# Patient Record
Sex: FEMALE | Race: WHITE | NOT HISPANIC OR LATINO | Employment: FULL TIME | URBAN - METROPOLITAN AREA
[De-identification: names, ages, dates, MRNs, and addresses within clinical notes are randomized per-mention and may not be internally consistent; named-entity substitution may affect disease eponyms.]

---

## 2021-03-24 ENCOUNTER — APPOINTMENT (OUTPATIENT)
Dept: RADIOLOGY | Facility: MEDICAL CENTER | Age: 36
End: 2021-03-24
Attending: EMERGENCY MEDICINE
Payer: MEDICAID

## 2021-03-24 ENCOUNTER — HOSPITAL ENCOUNTER (EMERGENCY)
Facility: MEDICAL CENTER | Age: 36
End: 2021-03-24
Attending: EMERGENCY MEDICINE
Payer: MEDICAID

## 2021-03-24 VITALS
WEIGHT: 174.38 LBS | TEMPERATURE: 98.2 F | DIASTOLIC BLOOD PRESSURE: 68 MMHG | HEIGHT: 69 IN | SYSTOLIC BLOOD PRESSURE: 109 MMHG | HEART RATE: 71 BPM | BODY MASS INDEX: 25.83 KG/M2 | RESPIRATION RATE: 15 BRPM | OXYGEN SATURATION: 99 %

## 2021-03-24 DIAGNOSIS — R10.31 RIGHT LOWER QUADRANT ABDOMINAL PAIN: ICD-10-CM

## 2021-03-24 DIAGNOSIS — N83.201 CYST OF RIGHT OVARY: ICD-10-CM

## 2021-03-24 LAB
ALBUMIN SERPL BCP-MCNC: 4.5 G/DL (ref 3.2–4.9)
ALBUMIN/GLOB SERPL: 1.5 G/DL
ALP SERPL-CCNC: 56 U/L (ref 30–99)
ALT SERPL-CCNC: 17 U/L (ref 2–50)
ANION GAP SERPL CALC-SCNC: 10 MMOL/L (ref 7–16)
APPEARANCE UR: CLEAR
AST SERPL-CCNC: 13 U/L (ref 12–45)
BACTERIA #/AREA URNS HPF: NEGATIVE /HPF
BASOPHILS # BLD AUTO: 0.5 % (ref 0–1.8)
BASOPHILS # BLD: 0.06 K/UL (ref 0–0.12)
BILIRUB SERPL-MCNC: 0.4 MG/DL (ref 0.1–1.5)
BILIRUB UR QL STRIP.AUTO: NEGATIVE
BUN SERPL-MCNC: 8 MG/DL (ref 8–22)
CALCIUM SERPL-MCNC: 9.5 MG/DL (ref 8.5–10.5)
CHLORIDE SERPL-SCNC: 107 MMOL/L (ref 96–112)
CO2 SERPL-SCNC: 23 MMOL/L (ref 20–33)
COLOR UR: YELLOW
CREAT SERPL-MCNC: 0.63 MG/DL (ref 0.5–1.4)
EOSINOPHIL # BLD AUTO: 0.04 K/UL (ref 0–0.51)
EOSINOPHIL NFR BLD: 0.3 % (ref 0–6.9)
EPI CELLS #/AREA URNS HPF: ABNORMAL /HPF
ERYTHROCYTE [DISTWIDTH] IN BLOOD BY AUTOMATED COUNT: 38.5 FL (ref 35.9–50)
GLOBULIN SER CALC-MCNC: 3.1 G/DL (ref 1.9–3.5)
GLUCOSE SERPL-MCNC: 95 MG/DL (ref 65–99)
GLUCOSE UR STRIP.AUTO-MCNC: NEGATIVE MG/DL
HCG UR QL: NEGATIVE
HCT VFR BLD AUTO: 39.8 % (ref 37–47)
HGB BLD-MCNC: 13 G/DL (ref 12–16)
HYALINE CASTS #/AREA URNS LPF: ABNORMAL /LPF
IMM GRANULOCYTES # BLD AUTO: 0.05 K/UL (ref 0–0.11)
IMM GRANULOCYTES NFR BLD AUTO: 0.4 % (ref 0–0.9)
KETONES UR STRIP.AUTO-MCNC: NEGATIVE MG/DL
LEUKOCYTE ESTERASE UR QL STRIP.AUTO: ABNORMAL
LIPASE SERPL-CCNC: 20 U/L (ref 11–82)
LYMPHOCYTES # BLD AUTO: 2.55 K/UL (ref 1–4.8)
LYMPHOCYTES NFR BLD: 21.8 % (ref 22–41)
MCH RBC QN AUTO: 27.8 PG (ref 27–33)
MCHC RBC AUTO-ENTMCNC: 32.7 G/DL (ref 33.6–35)
MCV RBC AUTO: 85 FL (ref 81.4–97.8)
MICRO URNS: ABNORMAL
MONOCYTES # BLD AUTO: 0.41 K/UL (ref 0–0.85)
MONOCYTES NFR BLD AUTO: 3.5 % (ref 0–13.4)
NEUTROPHILS # BLD AUTO: 8.57 K/UL (ref 2–7.15)
NEUTROPHILS NFR BLD: 73.5 % (ref 44–72)
NITRITE UR QL STRIP.AUTO: NEGATIVE
NRBC # BLD AUTO: 0 K/UL
NRBC BLD-RTO: 0 /100 WBC
PH UR STRIP.AUTO: 6.5 [PH] (ref 5–8)
PLATELET # BLD AUTO: 222 K/UL (ref 164–446)
PMV BLD AUTO: 11.8 FL (ref 9–12.9)
POTASSIUM SERPL-SCNC: 4 MMOL/L (ref 3.6–5.5)
PROT SERPL-MCNC: 7.6 G/DL (ref 6–8.2)
PROT UR QL STRIP: NEGATIVE MG/DL
RBC # BLD AUTO: 4.68 M/UL (ref 4.2–5.4)
RBC # URNS HPF: ABNORMAL /HPF
RBC UR QL AUTO: ABNORMAL
SODIUM SERPL-SCNC: 140 MMOL/L (ref 135–145)
SP GR UR STRIP.AUTO: 1.01
UROBILINOGEN UR STRIP.AUTO-MCNC: 0.2 MG/DL
WBC # BLD AUTO: 11.7 K/UL (ref 4.8–10.8)
WBC #/AREA URNS HPF: ABNORMAL /HPF

## 2021-03-24 PROCEDURE — 83690 ASSAY OF LIPASE: CPT

## 2021-03-24 PROCEDURE — 81025 URINE PREGNANCY TEST: CPT

## 2021-03-24 PROCEDURE — 85025 COMPLETE CBC W/AUTO DIFF WBC: CPT

## 2021-03-24 PROCEDURE — 76856 US EXAM PELVIC COMPLETE: CPT

## 2021-03-24 PROCEDURE — 81001 URINALYSIS AUTO W/SCOPE: CPT

## 2021-03-24 PROCEDURE — 74177 CT ABD & PELVIS W/CONTRAST: CPT

## 2021-03-24 PROCEDURE — 80053 COMPREHEN METABOLIC PANEL: CPT

## 2021-03-24 PROCEDURE — 700117 HCHG RX CONTRAST REV CODE 255: Performed by: EMERGENCY MEDICINE

## 2021-03-24 PROCEDURE — 99284 EMERGENCY DEPT VISIT MOD MDM: CPT

## 2021-03-24 RX ORDER — HYDROCODONE BITARTRATE AND ACETAMINOPHEN 5; 325 MG/1; MG/1
2 TABLET ORAL EVERY 4 HOURS PRN
Qty: 12 TABLET | Refills: 0 | Status: SHIPPED | OUTPATIENT
Start: 2021-03-24 | End: 2021-03-27

## 2021-03-24 RX ADMIN — IOHEXOL 90 ML: 350 INJECTION, SOLUTION INTRAVENOUS at 23:05

## 2021-03-25 NOTE — ED NOTES
"Pt discharged home via ambulatory to lobby with steady gait, AOx4, family accompanying. IV discontinued and gauze placed, pt in possession of belongings. Pt provided discharge education and information pertaining to medications and follow up appointments. Pt received copy of discharge instructions and verbalized understanding.     /68   Pulse 71   Temp 36.8 °C (98.2 °F) (Temporal)   Resp 15   Ht 1.753 m (5' 9\")   Wt 79.1 kg (174 lb 6.1 oz)   SpO2 99%   BMI 25.75 kg/m²   "

## 2021-03-25 NOTE — ED TRIAGE NOTES
"Chief Complaint   Patient presents with   • Sent by MD   • Abdominal Pain     36 yo female ambulatory to triage for above complaint. Pt reports 3/10 dull RLQ pain, states she had an US done today and \"they're unsure if there is blood flow to my right ovary\". Also reports burning with urination and red tinged urine.    Educated on triage process, encourage to inform staff of any changes.     /87   Pulse 74   Temp 36.7 °C (98.1 °F) (Temporal)   Resp 20   Ht 1.753 m (5' 9\")   Wt 79.1 kg (174 lb 6.1 oz)   SpO2 98%   BMI 25.75 kg/m²   "

## 2021-03-25 NOTE — ED PROVIDER NOTES
"ED Provider Note    CHIEF COMPLAINT  Chief Complaint   Patient presents with   • Sent by MD   • Abdominal Pain       HPI  Nissa Oliveros is a 35 y.o. female who presents for evaluation of right lower quadrant and right flank pain which is present off and on for several weeks.  Patient states it occasionally \"doubles me over in pain.\"  She has noticed intermittent symptoms consisting of dysuria and urinary frequency which have been treated several times with antibiotics as an outpatient presumptively.  These have resulted in transient improvement in the results however the ultrasound performed today at [facility] demonstrated a large cyst and they were unable  to demonstrate blood flow.  There is concern for a torsed ovary.  Patient notes right now her pain is controlled but is worse with palpation.  She describes no fevers or chills and has not had nausea, vomiting, or diarrhea.  She has occasionally had red-tinged urine.    REVIEW OF SYSTEMS  Constitutional: No fevers or chills  Skin: No rashes  HEENT: No sore throat, runny nose, sores, trouble swallowing, trouble speaking.  Neck: No neck pain, stiffness, or masses.  Chest: No pain   Pulm: No shortness of breath, cough, wheezing, stridor, or pain with inspiration/expiration  Gastrointestinal: No nausea, vomiting, diarrhea, constipation, bloating, melena, or hematochezia   Genitourinary: Intermittent dysuria with scant hematuria.  No vaginal bleeding or significant discharge.  Musculoskeletal: No recent trauma, pain, swelling, weakness  Neurologic: No sensory or motor changes. No confusion or disorientation.  Heme: No bleeding or bruising problems.   Immuno: No hx of recurrent infections      PAST MEDICAL HISTORY   No significant past medical history    SOCIAL HISTORY  Social History     Tobacco Use   • Smoking status: Never Smoker   • Smokeless tobacco: Never Used   Substance and Sexual Activity   • Alcohol use: Never   • Drug use: Never   • Sexual " "activity: Not on file       SURGICAL HISTORY  patient denies any surgical history    CURRENT MEDICATIONS  Home Medications     Reviewed by Latisha Cordero (Pharmacy Tech) on 03/24/21 at 2132  Med List Status: Complete   Medication Last Dose Status        Patient Cedric Taking any Medications                       ALLERGIES  No Known Allergies    PHYSICAL EXAM  VITAL SIGNS: /68   Pulse 71   Temp 36.8 °C (98.2 °F) (Temporal)   Resp 15   Ht 1.753 m (5' 9\")   Wt 79.1 kg (174 lb 6.1 oz)   SpO2 99%   BMI 25.75 kg/m²    Gen: Alert in no apparent distress.  HEENT: No signs of trauma, Bilateral external ears normal, Nose normal. Conjunctiva normal, Non-icteric.   Neck:  No tenderness, Supple, No masses  Lymphatic: No cervical lymphadenopathy noted.   Cardiovascular: Regular rate and rhythm, no murmurs.  Capillary refill less than 3 seconds to all extremities, 2+ distal pulses.  Thorax & Lungs: Normal breath sounds, No respiratory distress, No wheezing bilateral chest rise  Abdomen: Bowel sounds normal, Soft, mild/moderate right lower quadrant tenderness as well as left lower quadrant tenderness, some mild suprapubic tenderness.  No right upper quadrant or epigastric tenderness. No masses, No pulsatile masses. No Guarding or rebound  Skin: Warm, Dry, No erythema, No rash noted to exposed areas.   Extremities: Intact distal pulses, No edema  Neurologic: Alert , no facial droop, grossly normal coordination and strength  Psychiatric: Affect pleasant    LABS  Results for orders placed or performed during the hospital encounter of 03/24/21   CBC WITH DIFFERENTIAL   Result Value Ref Range    WBC 11.7 (H) 4.8 - 10.8 K/uL    RBC 4.68 4.20 - 5.40 M/uL    Hemoglobin 13.0 12.0 - 16.0 g/dL    Hematocrit 39.8 37.0 - 47.0 %    MCV 85.0 81.4 - 97.8 fL    MCH 27.8 27.0 - 33.0 pg    MCHC 32.7 (L) 33.6 - 35.0 g/dL    RDW 38.5 35.9 - 50.0 fL    Platelet Count 222 164 - 446 K/uL    MPV 11.8 9.0 - 12.9 fL    Neutrophils-Polys " 73.50 (H) 44.00 - 72.00 %    Lymphocytes 21.80 (L) 22.00 - 41.00 %    Monocytes 3.50 0.00 - 13.40 %    Eosinophils 0.30 0.00 - 6.90 %    Basophils 0.50 0.00 - 1.80 %    Immature Granulocytes 0.40 0.00 - 0.90 %    Nucleated RBC 0.00 /100 WBC    Neutrophils (Absolute) 8.57 (H) 2.00 - 7.15 K/uL    Lymphs (Absolute) 2.55 1.00 - 4.80 K/uL    Monos (Absolute) 0.41 0.00 - 0.85 K/uL    Eos (Absolute) 0.04 0.00 - 0.51 K/uL    Baso (Absolute) 0.06 0.00 - 0.12 K/uL    Immature Granulocytes (abs) 0.05 0.00 - 0.11 K/uL    NRBC (Absolute) 0.00 K/uL   COMP METABOLIC PANEL   Result Value Ref Range    Sodium 140 135 - 145 mmol/L    Potassium 4.0 3.6 - 5.5 mmol/L    Chloride 107 96 - 112 mmol/L    Co2 23 20 - 33 mmol/L    Anion Gap 10.0 7.0 - 16.0    Glucose 95 65 - 99 mg/dL    Bun 8 8 - 22 mg/dL    Creatinine 0.63 0.50 - 1.40 mg/dL    Calcium 9.5 8.5 - 10.5 mg/dL    AST(SGOT) 13 12 - 45 U/L    ALT(SGPT) 17 2 - 50 U/L    Alkaline Phosphatase 56 30 - 99 U/L    Total Bilirubin 0.4 0.1 - 1.5 mg/dL    Albumin 4.5 3.2 - 4.9 g/dL    Total Protein 7.6 6.0 - 8.2 g/dL    Globulin 3.1 1.9 - 3.5 g/dL    A-G Ratio 1.5 g/dL   LIPASE   Result Value Ref Range    Lipase 20 11 - 82 U/L   URINALYSIS    Specimen: Urine, Clean Catch   Result Value Ref Range    Color Yellow     Character Clear     Specific Gravity 1.008 <1.035    Ph 6.5 5.0 - 8.0    Glucose Negative Negative mg/dL    Ketones Negative Negative mg/dL    Protein Negative Negative mg/dL    Bilirubin Negative Negative    Urobilinogen, Urine 0.2 Negative    Nitrite Negative Negative    Leukocyte Esterase Small (A) Negative    Occult Blood Trace (A) Negative    Micro Urine Req Microscopic    URINE MICROSCOPIC (W/UA)   Result Value Ref Range    WBC 2-5 /hpf    RBC 2-5 (A) /hpf    Bacteria Negative None /hpf    Epithelial Cells Few /hpf    Hyaline Cast 0-2 /lpf   ESTIMATED GFR   Result Value Ref Range    GFR If African American >60 >60 mL/min/1.73 m 2    GFR If Non  >60 >60  mL/min/1.73 m 2   HCG QUALITATIVE UR (Lab)   Result Value Ref Range    Beta-Hcg Urine Negative Negative       RADIOLOGY  CT-ABDOMEN-PELVIS WITH   Final Result      1.  No acute abnormality in the abdomen or pelvis.   2.  Large, 7.4 cm right ovarian cyst.      US-PELVIC COMPLETE (TRANSABDOMINAL/TRANSVAGINAL) (COMBO)   Final Result      1.  Normal uterus. Well-positioned IUD.   2.  A 7.7 cm simple right ovarian cyst.   3.  Normal left ovary.   4.  Trace pelvic free fluid.              COURSE & MEDICAL DECISION MAKING  This is a very pleasant 35-year-old female here for evaluation of possible torsed ovary with compromised blood supply.  The patient's history seems inconsistent with this issue, however, as it seems more chronic.  Patient sounds like she has been appropriately treated with antibiotics at several points in the recent past for similar symptoms which seem to resolve at least to some degree after the antibiotics.  We will need to reultrasound to prove blood flow to the ovary as she certainly is a risk with her ovarian cyst.  She does not appear to be in extremis and is quite pleasant and comfortable appearing.  I discussed CT imaging if ultrasound failed to demonstrate the issue as I am concerned she may have a smoldering appendicitis or some other form of pathology requiring intervention.  She stated understanding of this.    10:25 PM  Informed of results of the ultrasound and the fact that she appears to have good blood flow to both ovaries.  It is possible that her cyst is the source of her symptoms however I cannot prove this and I feel a half to disprove alternate pathology by ordering a CT scan.  She stated understanding of the possible future complications from the radiation dosing and agrees to proceed with the study as she too needs the reassurance.    CT imaging was reassuring although does redemonstrate the large ovarian cyst.  There is a small amount of free fluid in the pelvis which may or may  not be related however it does not appear to be associated with any significant pathology requiring intervention or emergent consultation.  Patient appears calm and appears comfortable.  She states understanding the findings and the plan for discharge and follow-up with her primary care physician if symptoms persist.  She will return if they worsen or change in any way.    FINAL IMPRESSION  1. Right lower quadrant abdominal pain    2. Cyst of right ovary        Electronically signed by: Frankie Duran M.D., 3/24/2021 8:18 PM

## 2021-03-25 NOTE — ED NOTES
Med Rec complete per Pt w/family at bedside.  Allergies reviewed  No oral ABX in the last 14 days.

## 2021-09-02 ENCOUNTER — GYNECOLOGY VISIT (OUTPATIENT)
Dept: OBGYN | Facility: CLINIC | Age: 36
End: 2021-09-02
Payer: MEDICAID

## 2021-09-02 VITALS — BODY MASS INDEX: 25.4 KG/M2 | WEIGHT: 172 LBS | DIASTOLIC BLOOD PRESSURE: 78 MMHG | SYSTOLIC BLOOD PRESSURE: 118 MMHG

## 2021-09-02 DIAGNOSIS — N83.201 CYST OF RIGHT OVARY: ICD-10-CM

## 2021-09-02 DIAGNOSIS — Z30.011 ENCOUNTER FOR INITIAL PRESCRIPTION OF CONTRACEPTIVE PILLS: ICD-10-CM

## 2021-09-02 PROCEDURE — 99203 OFFICE O/P NEW LOW 30 MIN: CPT | Performed by: OBSTETRICS & GYNECOLOGY

## 2021-09-02 RX ORDER — LEVONORGESTREL AND ETHINYL ESTRADIOL 0.1-0.02MG
1 KIT ORAL DAILY
Qty: 84 TABLET | Refills: 4 | Status: SHIPPED | OUTPATIENT
Start: 2021-09-02

## 2021-09-02 ASSESSMENT — FIBROSIS 4 INDEX: FIB4 SCORE: 0.47

## 2021-09-02 NOTE — NON-PROVIDER
Pt here for new pt appt  Pt states  Pharmacy verified  Good #: [phone] (home)   LMP:  PAP:  BC:  STD Testing:

## 2021-09-02 NOTE — PROGRESS NOTES
Subjective     Nissa Oliveros is a 35 y.o. female who presents with New Patient (ovarian cyst)        CC: ovarian cyst    HPI: 35-year-old  2 para 1-0-1-1, last menstrual period 2021, not using contraception, presents for follow-up of large ovarian cyst.  The patient was seen in the emergency room in 2021 and noted to have a 7.7 cm right simple ovarian cyst.  She was having severe pain at this time, which has resolved.  She had some pain yesterday, described as mild, but attributes it to her menses.  She denies nausea, vomiting, fevers, chills, abnormal discharge.  She has no history of migraine with aura, liver dysfunction, hypertension, tobacco use, or DVT.  She was previously on OCPs, and ran out, desires refill.  Last Pap smear was in , and normal per patient report.  She has no history of sexually transmitted infections.    History reviewed. No pertinent past medical history.    History reviewed. No pertinent surgical history.      Current Outpatient Medications:   •  levonorgestrel-ethinyl estradiol (AVIANE) 0.1-20 MG-MCG per tablet, Take 1 Tablet by mouth every day., Disp: 84 Tablet, Rfl: 4    Patient has no known allergies.    Family History   Problem Relation Age of Onset   • Diabetes Mother    • Hypertension Mother    • Cancer Father        Social History     Socioeconomic History   • Marital status: Single     Spouse name: Not on file   • Number of children: Not on file   • Years of education: Not on file   • Highest education level: Not on file   Occupational History   • Not on file   Tobacco Use   • Smoking status: Never Smoker   • Smokeless tobacco: Never Used   Substance and Sexual Activity   • Alcohol use: Never   • Drug use: Never   • Sexual activity: Not on file   Other Topics Concern   • Not on file   Social History Narrative   • Not on file     Social Determinants of Health     Financial Resource Strain:    • Difficulty of Paying Living Expenses:    Food Insecurity:    •  Worried About Running Out of Food in the Last Year:    • Ran Out of Food in the Last Year:    Transportation Needs:    • Lack of Transportation (Medical):    • Lack of Transportation (Non-Medical):    Physical Activity:    • Days of Exercise per Week:    • Minutes of Exercise per Session:    Stress:    • Feeling of Stress :    Social Connections:    • Frequency of Communication with Friends and Family:    • Frequency of Social Gatherings with Friends and Family:    • Attends [employer] Services:    • Active Member of Clubs or Organizations:    • Attends Club or Organization Meetings:    • Marital Status:    Intimate Partner Violence:    • Fear of Current or Ex-Partner:    • Emotionally Abused:    • Physically Abused:    • Sexually Abused:        OB History    Para Term  AB Living   2 1 1 0 1 1   SAB TAB Ectopic Molar Multiple Live Births   0 1 0 0 0 1       ROS REVIEW OF SYSTEMS:    Pertinent positives and negatives mentioned in HPI.    All other systems reviewed and are negative or noncontributory.           Objective     /78   Wt 78 kg (172 lb)   LMP 2021   BMI 25.40 kg/m²      Physical Exam        GENERAL: Alert, in no apparent distress  PSYCHIATRIC: Appropriate affect, intact insight and judgement.  NECK:  Nontender, no masses.  No Thyromegaly or nodules. No lymphadenopathy.  RESPIRATORY: Normal respiratory effort.  Lungs clear to auscultation.   CARDIOVASCULAR: RRR, no murmur, gallop, or rub.  ABDOMEN: Soft, nontender, nondistended.  No palpable masses.  No rebound or guarding.  No inguinal lymphadenopathy.  No hepatosplenomegaly.  No hernias.  BACK: No CVA tenderness  EXTREMITIES: No edema  SKIN: No rash    BREAST: No masses or tenderness.  No skin changes.  No nipple inversion or discharge. No axillary lymphadenopathy.      GENITOURINARY:  Normal external genitalia, no lesions.  Normal urethral meatus, no masses or tenderness.  Normal bladder without fullness or masses.  Vagina  well estrogenized, no vaginal discharge or lesions.  Large amount of blood in vault.  Cervix without lesions or discharge, nontender.  Uterus normal size, shape, and contour, nontender.  Adnexa nontender, no masses.  Normal anus and perineum.    Rectal Exam - not indicated.     Radiology 3/24/2021  Pelvic ultrasound -uterus measures 4.1 x 10.8 x 5.5 cm with normal myometrium.  Endometrial echo is less than 5 mm.  The right ovary measures 7.1 x 5.0 x 6.7 cm with a simple right ovarian cyst measuring 7.7 x 6.0 x 4.7 cm.  The left ovary measures 6.5 x 3.6 x 3.8 cm with a corpus luteal cyst present.  There is no fluid in the cul-de-sac.    CT of abdomen and pelvis from 3/24/2021 also reviewed -unremarkable except for right ovarian cyst.         Assessment & Plan        1. Cyst of right ovary  Examination is normal today, no cysts are palpable..  We will obtain repeat ultrasound to check for persistence of cyst.  If the cyst is still persistent and large, we discussed the need for possible surgical intervention by laparoscopic cystectomy.  If the cyst is resolved, no further management is necessary.  She has been on OCPs, and this can help to prevent future cyst.  - US-PELVIC COMPLETE (TRANSABDOMINAL/TRANSVAGINAL) (COMBO); Future    2. Encounter for initial prescription of contraceptive pills  Alesse 1 po daily #84, RF4.    We will call with ultrasound results, and further management.

## 2021-09-28 ENCOUNTER — HOSPITAL ENCOUNTER (OUTPATIENT)
Dept: RADIOLOGY | Facility: MEDICAL CENTER | Age: 36
End: 2021-09-28
Attending: OBSTETRICS & GYNECOLOGY
Payer: COMMERCIAL

## 2021-09-28 DIAGNOSIS — N83.201 CYST OF RIGHT OVARY: ICD-10-CM

## 2021-09-28 PROCEDURE — 76830 TRANSVAGINAL US NON-OB: CPT

## 2021-10-01 ENCOUNTER — TELEPHONE (OUTPATIENT)
Dept: OBGYN | Facility: CLINIC | Age: 36
End: 2021-10-01

## 2021-10-01 NOTE — TELEPHONE ENCOUNTER
----- Message from Earnestine Barcenas M.D. sent at 9/30/2021  5:01 PM PDT -----  Please call patient and inform her there are 2 cysts present, a larger right ovarian cyst measuring 6.5 cm and a smaller left ovarian cyst measuring 4.7 cm.  The cysts look like they could possibly be endometriomas.  Please schedule her an appointment for further discussion of management.      Called pt and notified as above. Pt agreed and verbalized understanding. Pt transferred to [facility] to scheduled f/u appt.

## 2021-11-09 ENCOUNTER — APPOINTMENT (OUTPATIENT)
Dept: OBGYN | Facility: CLINIC | Age: 36
End: 2021-11-09
Payer: COMMERCIAL

## 2022-04-04 ENCOUNTER — PRE-ADMISSION TESTING (OUTPATIENT)
Dept: ADMISSIONS | Facility: MEDICAL CENTER | Age: 37
End: 2022-04-04
Attending: OBSTETRICS & GYNECOLOGY
Payer: COMMERCIAL

## 2022-04-07 NOTE — H&P
DATE OF ADMISSION:  2022     HISTORY OF PRESENT ILLNESS:  The patient is a 36-year-old  1, para 1   with 1 prior , was referred for ovarian cyst noted on ultrasound; and   repeated a CT scan, followup had shown persistent ovarian cyst with probably   hemorrhagic or endometrioma.  The patient was seen in the office, evaluated   and I discussed the various options, opted for laparoscopic ovarian   cystectomy, possible evaluation for endometriosis, treatment for   endometriosis, lysis of adhesions, tubal insufflation and all other indicated   procedure.  The patient is here for preop appointment.  The patient has   history of anemia, which has been corrected with iron supplements.    Depression, is currently not on any medications.     OBSTETRIC HISTORY:   2, para 1, 1 full term delivery,  in   2015.  The patient has been using IUD after the delivery and removed 1 year   ago and has been trying to get pregnant.     SOCIAL HISTORY:  Denies smoking or alcohol abuse.     PAST SURGICAL HISTORY:  History of one  and had breast biopsy.     ALLERGIES:  No known drug allergies.     FAMILY HISTORY:  Mother has asthma, diabetes and hepatitis. Sister has history   of alcoholism and drug abuse.  Maternal grandmother has heart disease.     REVIEW OF SYSTEMS:  The patient is alert and oriented.  Denies shortness of   breath, chest pain or palpitation.  Regular bowel and bladder habits.     SYSTEMIC EXAMINATION:  VITAL SIGNS:  Stable.  See EMR for current vitals.  The patient is 5 feet 9   inch tall and weighs 156 pounds.  CONSTITUTIONAL:  The patient is awake, alert, well developed, well nourished.  RESPIRATORY:  The patient is relaxed, breathes without effort.  Chest expands   symmetrically.  Lungs are clear to auscultate bilaterally.  No crackles,   wheezes or rhonchi.  CARDIOVASCULAR:  Rate is normal, rhythm is regular, S1, S2 normal.  No   murmurs, gallops or  rubs.  GASTROINTESTINAL:  Abdomen is soft, nontender. No guarding or rigidity.  Bowel   sounds are normal.  PELVIC:  Deferred.     IMPRESSION AND PLAN:  A 36-year-old  1, para 1-0-0-1 with 1 prior    with pelvic pain with ultrasound findings of a 7.5-cm ovarian cyst,   confirmed on CT scan, which was suggestive of hemorrhagic cyst.  The patient   has been trying to get pregnant, got out of her IUD the past year, stopped her   birth control pills a few months ago.  The patient denies any prior history   of endometriosis in self or family.  After extensive counseling, the patient   opted for surgery and scheduled for laparoscopic right ovarian   cystectomy/unilateral cystectomy, treatment of endometriosis, lysis of   adhesions and excision and ablation, use of gold laser, tubal insufflation,   possibility of unilateral salpingo-oophorectomy was discussed.  The patient   understands and wants to go ahead with the procedure.  Risks inclusive of but   not limited to infection, injury, bleeding was discussed.  Postoperative   recovery explained.  The patient wants to go ahead with the procedure.        ______________________________  MD REBA Infante/SAUMYA/SHEILA    DD:  2022 13:47  DT:  2022 14:10    Job#:  [id number]

## 2022-04-13 ENCOUNTER — PRE-ADMISSION TESTING (OUTPATIENT)
Dept: ADMISSIONS | Facility: MEDICAL CENTER | Age: 37
End: 2022-04-13
Attending: OBSTETRICS & GYNECOLOGY
Payer: COMMERCIAL

## 2022-04-13 DIAGNOSIS — Z01.812 PRE-OPERATIVE LABORATORY EXAMINATION: ICD-10-CM

## 2022-04-13 LAB
ANION GAP SERPL CALC-SCNC: 11 MMOL/L (ref 7–16)
APPEARANCE UR: CLEAR
BASOPHILS # BLD AUTO: 0.7 % (ref 0–1.8)
BASOPHILS # BLD: 0.06 K/UL (ref 0–0.12)
BILIRUB UR QL STRIP.AUTO: NEGATIVE
BUN SERPL-MCNC: 11 MG/DL (ref 8–22)
CALCIUM SERPL-MCNC: 8.7 MG/DL (ref 8.5–10.5)
CHLORIDE SERPL-SCNC: 105 MMOL/L (ref 96–112)
CO2 SERPL-SCNC: 21 MMOL/L (ref 20–33)
COLOR UR: YELLOW
CREAT SERPL-MCNC: 0.71 MG/DL (ref 0.5–1.4)
EOSINOPHIL # BLD AUTO: 0.18 K/UL (ref 0–0.51)
EOSINOPHIL NFR BLD: 2.1 % (ref 0–6.9)
ERYTHROCYTE [DISTWIDTH] IN BLOOD BY AUTOMATED COUNT: 39.8 FL (ref 35.9–50)
GFR SERPLBLD CREATININE-BSD FMLA CKD-EPI: 114 ML/MIN/1.73 M 2
GLUCOSE SERPL-MCNC: 92 MG/DL (ref 65–99)
GLUCOSE UR STRIP.AUTO-MCNC: NEGATIVE MG/DL
HCG SERPL QL: NEGATIVE
HCT VFR BLD AUTO: 37.1 % (ref 37–47)
HGB BLD-MCNC: 12.3 G/DL (ref 12–16)
IMM GRANULOCYTES # BLD AUTO: 0.03 K/UL (ref 0–0.11)
IMM GRANULOCYTES NFR BLD AUTO: 0.3 % (ref 0–0.9)
KETONES UR STRIP.AUTO-MCNC: NEGATIVE MG/DL
LEUKOCYTE ESTERASE UR QL STRIP.AUTO: NEGATIVE
LYMPHOCYTES # BLD AUTO: 2.5 K/UL (ref 1–4.8)
LYMPHOCYTES NFR BLD: 28.6 % (ref 22–41)
MCH RBC QN AUTO: 28.1 PG (ref 27–33)
MCHC RBC AUTO-ENTMCNC: 33.2 G/DL (ref 33.6–35)
MCV RBC AUTO: 84.9 FL (ref 81.4–97.8)
MICRO URNS: NORMAL
MONOCYTES # BLD AUTO: 0.35 K/UL (ref 0–0.85)
MONOCYTES NFR BLD AUTO: 4 % (ref 0–13.4)
NEUTROPHILS # BLD AUTO: 5.63 K/UL (ref 2–7.15)
NEUTROPHILS NFR BLD: 64.3 % (ref 44–72)
NITRITE UR QL STRIP.AUTO: NEGATIVE
NRBC # BLD AUTO: 0 K/UL
NRBC BLD-RTO: 0 /100 WBC
PH UR STRIP.AUTO: 6.5 [PH] (ref 5–8)
PLATELET # BLD AUTO: 181 K/UL (ref 164–446)
PMV BLD AUTO: 11.5 FL (ref 9–12.9)
POTASSIUM SERPL-SCNC: 4.1 MMOL/L (ref 3.6–5.5)
PROT UR QL STRIP: NEGATIVE MG/DL
RBC # BLD AUTO: 4.37 M/UL (ref 4.2–5.4)
RBC UR QL AUTO: NEGATIVE
SODIUM SERPL-SCNC: 137 MMOL/L (ref 135–145)
SP GR UR STRIP.AUTO: 1.03
UROBILINOGEN UR STRIP.AUTO-MCNC: 0.2 MG/DL
WBC # BLD AUTO: 8.8 K/UL (ref 4.8–10.8)

## 2022-04-13 PROCEDURE — 81003 URINALYSIS AUTO W/O SCOPE: CPT

## 2022-04-13 PROCEDURE — 80048 BASIC METABOLIC PNL TOTAL CA: CPT

## 2022-04-13 PROCEDURE — 85025 COMPLETE CBC W/AUTO DIFF WBC: CPT

## 2022-04-13 PROCEDURE — 84703 CHORIONIC GONADOTROPIN ASSAY: CPT

## 2022-04-13 PROCEDURE — 36415 COLL VENOUS BLD VENIPUNCTURE: CPT

## 2022-04-14 ENCOUNTER — APPOINTMENT (OUTPATIENT)
Dept: ADMISSIONS | Facility: MEDICAL CENTER | Age: 37
End: 2022-04-14
Attending: OBSTETRICS & GYNECOLOGY
Payer: COMMERCIAL

## 2022-04-20 ENCOUNTER — ANESTHESIA (OUTPATIENT)
Dept: SURGERY | Facility: MEDICAL CENTER | Age: 37
End: 2022-04-20
Payer: COMMERCIAL

## 2022-04-20 ENCOUNTER — ANESTHESIA EVENT (OUTPATIENT)
Dept: SURGERY | Facility: MEDICAL CENTER | Age: 37
End: 2022-04-20
Payer: COMMERCIAL

## 2022-04-20 ENCOUNTER — PHARMACY VISIT (OUTPATIENT)
Dept: PHARMACY | Facility: MEDICAL CENTER | Age: 37
End: 2022-04-20
Payer: COMMERCIAL

## 2022-04-20 ENCOUNTER — HOSPITAL ENCOUNTER (OUTPATIENT)
Facility: MEDICAL CENTER | Age: 37
End: 2022-04-20
Attending: OBSTETRICS & GYNECOLOGY | Admitting: OBSTETRICS & GYNECOLOGY
Payer: COMMERCIAL

## 2022-04-20 VITALS
OXYGEN SATURATION: 94 % | BODY MASS INDEX: 23.41 KG/M2 | RESPIRATION RATE: 18 BRPM | HEART RATE: 92 BPM | SYSTOLIC BLOOD PRESSURE: 111 MMHG | HEIGHT: 69 IN | DIASTOLIC BLOOD PRESSURE: 69 MMHG | WEIGHT: 158.07 LBS | TEMPERATURE: 97.6 F

## 2022-04-20 DIAGNOSIS — G89.18 POST-OP PAIN: ICD-10-CM

## 2022-04-20 LAB
HCG UR QL: NEGATIVE
PATHOLOGY CONSULT NOTE: NORMAL

## 2022-04-20 PROCEDURE — 81025 URINE PREGNANCY TEST: CPT

## 2022-04-20 PROCEDURE — 700105 HCHG RX REV CODE 258: Performed by: OBSTETRICS & GYNECOLOGY

## 2022-04-20 PROCEDURE — 500868 HCHG NEEDLE, SURGI(VARES): Performed by: OBSTETRICS & GYNECOLOGY

## 2022-04-20 PROCEDURE — 160046 HCHG PACU - 1ST 60 MINS PHASE II: Performed by: OBSTETRICS & GYNECOLOGY

## 2022-04-20 PROCEDURE — 700111 HCHG RX REV CODE 636 W/ 250 OVERRIDE (IP): Performed by: ANESTHESIOLOGY

## 2022-04-20 PROCEDURE — 88305 TISSUE EXAM BY PATHOLOGIST: CPT | Mod: 59

## 2022-04-20 PROCEDURE — 160048 HCHG OR STATISTICAL LEVEL 1-5: Performed by: OBSTETRICS & GYNECOLOGY

## 2022-04-20 PROCEDURE — 500002 HCHG ADHESIVE, DERMABOND: Performed by: OBSTETRICS & GYNECOLOGY

## 2022-04-20 PROCEDURE — 160028 HCHG SURGERY MINUTES - 1ST 30 MINS LEVEL 3: Performed by: OBSTETRICS & GYNECOLOGY

## 2022-04-20 PROCEDURE — 160035 HCHG PACU - 1ST 60 MINS PHASE I: Performed by: OBSTETRICS & GYNECOLOGY

## 2022-04-20 PROCEDURE — 160039 HCHG SURGERY MINUTES - EA ADDL 1 MIN LEVEL 3: Performed by: OBSTETRICS & GYNECOLOGY

## 2022-04-20 PROCEDURE — 501582 HCHG TROCAR, THRD BLADED: Performed by: OBSTETRICS & GYNECOLOGY

## 2022-04-20 PROCEDURE — 700101 HCHG RX REV CODE 250: Performed by: OBSTETRICS & GYNECOLOGY

## 2022-04-20 PROCEDURE — A9270 NON-COVERED ITEM OR SERVICE: HCPCS | Performed by: ANESTHESIOLOGY

## 2022-04-20 PROCEDURE — 700102 HCHG RX REV CODE 250 W/ 637 OVERRIDE(OP): Performed by: ANESTHESIOLOGY

## 2022-04-20 PROCEDURE — 502000 HCHG MISC OR IMPLANTS RC 0278: Performed by: OBSTETRICS & GYNECOLOGY

## 2022-04-20 PROCEDURE — 160025 RECOVERY II MINUTES (STATS): Performed by: OBSTETRICS & GYNECOLOGY

## 2022-04-20 PROCEDURE — 160047 HCHG PACU  - EA ADDL 30 MINS PHASE II: Performed by: OBSTETRICS & GYNECOLOGY

## 2022-04-20 PROCEDURE — 160009 HCHG ANES TIME/MIN: Performed by: OBSTETRICS & GYNECOLOGY

## 2022-04-20 PROCEDURE — 88342 IMHCHEM/IMCYTCHM 1ST ANTB: CPT

## 2022-04-20 PROCEDURE — RXMED WILLOW AMBULATORY MEDICATION CHARGE: Performed by: OBSTETRICS & GYNECOLOGY

## 2022-04-20 PROCEDURE — 00840 ANES IPER PX LOWER ABD NOS: CPT | Performed by: ANESTHESIOLOGY

## 2022-04-20 PROCEDURE — 700101 HCHG RX REV CODE 250: Performed by: ANESTHESIOLOGY

## 2022-04-20 PROCEDURE — C1765 ADHESION BARRIER: HCPCS | Performed by: OBSTETRICS & GYNECOLOGY

## 2022-04-20 PROCEDURE — 160002 HCHG RECOVERY MINUTES (STAT): Performed by: OBSTETRICS & GYNECOLOGY

## 2022-04-20 PROCEDURE — 700111 HCHG RX REV CODE 636 W/ 250 OVERRIDE (IP): Performed by: OBSTETRICS & GYNECOLOGY

## 2022-04-20 PROCEDURE — 160036 HCHG PACU - EA ADDL 30 MINS PHASE I: Performed by: OBSTETRICS & GYNECOLOGY

## 2022-04-20 PROCEDURE — 500886 HCHG PACK, LAPAROSCOPY: Performed by: OBSTETRICS & GYNECOLOGY

## 2022-04-20 PROCEDURE — 501838 HCHG SUTURE GENERAL: Performed by: OBSTETRICS & GYNECOLOGY

## 2022-04-20 RX ORDER — IPRATROPIUM BROMIDE AND ALBUTEROL SULFATE 2.5; .5 MG/3ML; MG/3ML
3 SOLUTION RESPIRATORY (INHALATION)
Status: DISCONTINUED | OUTPATIENT
Start: 2022-04-20 | End: 2022-04-20 | Stop reason: HOSPADM

## 2022-04-20 RX ORDER — LIDOCAINE HYDROCHLORIDE 20 MG/ML
INJECTION, SOLUTION EPIDURAL; INFILTRATION; INTRACAUDAL; PERINEURAL PRN
Status: DISCONTINUED | OUTPATIENT
Start: 2022-04-20 | End: 2022-04-20 | Stop reason: SURG

## 2022-04-20 RX ORDER — MORPHINE SULFATE 4 MG/ML
1 INJECTION INTRAVENOUS
Status: DISCONTINUED | OUTPATIENT
Start: 2022-04-20 | End: 2022-04-20 | Stop reason: HOSPADM

## 2022-04-20 RX ORDER — MORPHINE SULFATE 10 MG/ML
5 INJECTION, SOLUTION INTRAMUSCULAR; INTRAVENOUS
Status: DISCONTINUED | OUTPATIENT
Start: 2022-04-20 | End: 2022-04-20 | Stop reason: HOSPADM

## 2022-04-20 RX ORDER — SODIUM CHLORIDE, SODIUM LACTATE, POTASSIUM CHLORIDE, CALCIUM CHLORIDE 600; 310; 30; 20 MG/100ML; MG/100ML; MG/100ML; MG/100ML
INJECTION, SOLUTION INTRAVENOUS CONTINUOUS
Status: ACTIVE | OUTPATIENT
Start: 2022-04-20 | End: 2022-04-20

## 2022-04-20 RX ORDER — MORPHINE SULFATE 4 MG/ML
2 INJECTION INTRAVENOUS
Status: DISCONTINUED | OUTPATIENT
Start: 2022-04-20 | End: 2022-04-20 | Stop reason: HOSPADM

## 2022-04-20 RX ORDER — DEXAMETHASONE SODIUM PHOSPHATE 4 MG/ML
INJECTION, SOLUTION INTRA-ARTICULAR; INTRALESIONAL; INTRAMUSCULAR; INTRAVENOUS; SOFT TISSUE PRN
Status: DISCONTINUED | OUTPATIENT
Start: 2022-04-20 | End: 2022-04-20 | Stop reason: SURG

## 2022-04-20 RX ORDER — CEFAZOLIN SODIUM 1 G/3ML
INJECTION, POWDER, FOR SOLUTION INTRAMUSCULAR; INTRAVENOUS PRN
Status: DISCONTINUED | OUTPATIENT
Start: 2022-04-20 | End: 2022-04-20 | Stop reason: SURG

## 2022-04-20 RX ORDER — MORPHINE SULFATE 0.5 MG/ML
INJECTION, SOLUTION EPIDURAL; INTRATHECAL; INTRAVENOUS PRN
Status: DISCONTINUED | OUTPATIENT
Start: 2022-04-20 | End: 2022-04-20 | Stop reason: SURG

## 2022-04-20 RX ORDER — LABETALOL HYDROCHLORIDE 5 MG/ML
5 INJECTION, SOLUTION INTRAVENOUS
Status: DISCONTINUED | OUTPATIENT
Start: 2022-04-20 | End: 2022-04-20 | Stop reason: HOSPADM

## 2022-04-20 RX ORDER — MEPERIDINE HYDROCHLORIDE 25 MG/ML
12.5 INJECTION INTRAMUSCULAR; INTRAVENOUS; SUBCUTANEOUS
Status: DISCONTINUED | OUTPATIENT
Start: 2022-04-20 | End: 2022-04-20 | Stop reason: HOSPADM

## 2022-04-20 RX ORDER — MIDAZOLAM HYDROCHLORIDE 1 MG/ML
1 INJECTION INTRAMUSCULAR; INTRAVENOUS
Status: DISCONTINUED | OUTPATIENT
Start: 2022-04-20 | End: 2022-04-20 | Stop reason: HOSPADM

## 2022-04-20 RX ORDER — HALOPERIDOL 5 MG/ML
1 INJECTION INTRAMUSCULAR
Status: DISCONTINUED | OUTPATIENT
Start: 2022-04-20 | End: 2022-04-20 | Stop reason: HOSPADM

## 2022-04-20 RX ORDER — OXYCODONE HYDROCHLORIDE AND ACETAMINOPHEN 5; 325 MG/1; MG/1
2 TABLET ORAL
Status: COMPLETED | OUTPATIENT
Start: 2022-04-20 | End: 2022-04-20

## 2022-04-20 RX ORDER — MIDAZOLAM HYDROCHLORIDE 1 MG/ML
INJECTION INTRAMUSCULAR; INTRAVENOUS PRN
Status: DISCONTINUED | OUTPATIENT
Start: 2022-04-20 | End: 2022-04-20 | Stop reason: SURG

## 2022-04-20 RX ORDER — ONDANSETRON 2 MG/ML
4 INJECTION INTRAMUSCULAR; INTRAVENOUS
Status: DISCONTINUED | OUTPATIENT
Start: 2022-04-20 | End: 2022-04-20 | Stop reason: HOSPADM

## 2022-04-20 RX ORDER — PHENYLEPHRINE HCL IN 0.9% NACL 0.5 MG/5ML
SYRINGE (ML) INTRAVENOUS PRN
Status: DISCONTINUED | OUTPATIENT
Start: 2022-04-20 | End: 2022-04-20 | Stop reason: SURG

## 2022-04-20 RX ORDER — OXYCODONE HYDROCHLORIDE AND ACETAMINOPHEN 5; 325 MG/1; MG/1
1 TABLET ORAL
Status: COMPLETED | OUTPATIENT
Start: 2022-04-20 | End: 2022-04-20

## 2022-04-20 RX ORDER — KETOROLAC TROMETHAMINE 30 MG/ML
INJECTION, SOLUTION INTRAMUSCULAR; INTRAVENOUS PRN
Status: DISCONTINUED | OUTPATIENT
Start: 2022-04-20 | End: 2022-04-20 | Stop reason: SURG

## 2022-04-20 RX ORDER — OXYCODONE HYDROCHLORIDE AND ACETAMINOPHEN 5; 325 MG/1; MG/1
1 TABLET ORAL EVERY 4 HOURS PRN
Qty: 15 TABLET | Refills: 0 | Status: SHIPPED | OUTPATIENT
Start: 2022-04-20 | End: 2022-04-24

## 2022-04-20 RX ORDER — IBUPROFEN 600 MG/1
600 TABLET ORAL EVERY 6 HOURS PRN
Qty: 30 TABLET | Refills: 1 | Status: SHIPPED | OUTPATIENT
Start: 2022-04-20

## 2022-04-20 RX ORDER — ROCURONIUM BROMIDE 10 MG/ML
INJECTION, SOLUTION INTRAVENOUS PRN
Status: DISCONTINUED | OUTPATIENT
Start: 2022-04-20 | End: 2022-04-20 | Stop reason: SURG

## 2022-04-20 RX ORDER — BUPIVACAINE HYDROCHLORIDE AND EPINEPHRINE 2.5; 5 MG/ML; UG/ML
INJECTION, SOLUTION EPIDURAL; INFILTRATION; INTRACAUDAL; PERINEURAL
Status: DISCONTINUED | OUTPATIENT
Start: 2022-04-20 | End: 2022-04-20 | Stop reason: HOSPADM

## 2022-04-20 RX ORDER — DIPHENHYDRAMINE HYDROCHLORIDE 50 MG/ML
12.5 INJECTION INTRAMUSCULAR; INTRAVENOUS
Status: DISCONTINUED | OUTPATIENT
Start: 2022-04-20 | End: 2022-04-20 | Stop reason: HOSPADM

## 2022-04-20 RX ORDER — ONDANSETRON 2 MG/ML
INJECTION INTRAMUSCULAR; INTRAVENOUS PRN
Status: DISCONTINUED | OUTPATIENT
Start: 2022-04-20 | End: 2022-04-20 | Stop reason: SURG

## 2022-04-20 RX ORDER — HYDRALAZINE HYDROCHLORIDE 20 MG/ML
5 INJECTION INTRAMUSCULAR; INTRAVENOUS
Status: DISCONTINUED | OUTPATIENT
Start: 2022-04-20 | End: 2022-04-20 | Stop reason: HOSPADM

## 2022-04-20 RX ADMIN — SODIUM CHLORIDE, POTASSIUM CHLORIDE, SODIUM LACTATE AND CALCIUM CHLORIDE: 600; 310; 30; 20 INJECTION, SOLUTION INTRAVENOUS at 09:37

## 2022-04-20 RX ADMIN — Medication 200 MCG: at 10:39

## 2022-04-20 RX ADMIN — SODIUM CHLORIDE, POTASSIUM CHLORIDE, SODIUM LACTATE AND CALCIUM CHLORIDE: 600; 310; 30; 20 INJECTION, SOLUTION INTRAVENOUS at 11:59

## 2022-04-20 RX ADMIN — FENTANYL CITRATE 100 MCG: 50 INJECTION, SOLUTION INTRAMUSCULAR; INTRAVENOUS at 10:14

## 2022-04-20 RX ADMIN — ROCURONIUM BROMIDE 50 MG: 10 INJECTION, SOLUTION INTRAVENOUS at 10:17

## 2022-04-20 RX ADMIN — SUGAMMADEX 200 MG: 100 INJECTION, SOLUTION INTRAVENOUS at 12:54

## 2022-04-20 RX ADMIN — ROCURONIUM BROMIDE 20 MG: 10 INJECTION, SOLUTION INTRAVENOUS at 11:12

## 2022-04-20 RX ADMIN — FENTANYL CITRATE 25 MCG: 50 INJECTION, SOLUTION INTRAMUSCULAR; INTRAVENOUS at 14:10

## 2022-04-20 RX ADMIN — ROCURONIUM BROMIDE 20 MG: 10 INJECTION, SOLUTION INTRAVENOUS at 12:07

## 2022-04-20 RX ADMIN — Medication 200 MCG: at 10:26

## 2022-04-20 RX ADMIN — DEXAMETHASONE SODIUM PHOSPHATE 8 MG: 4 INJECTION, SOLUTION INTRA-ARTICULAR; INTRALESIONAL; INTRAMUSCULAR; INTRAVENOUS; SOFT TISSUE at 10:30

## 2022-04-20 RX ADMIN — OXYCODONE HYDROCHLORIDE AND ACETAMINOPHEN 2 TABLET: 5; 325 TABLET ORAL at 14:10

## 2022-04-20 RX ADMIN — MORPHINE SULFATE 5 MG: 0.5 INJECTION EPIDURAL; INTRATHECAL; INTRAVENOUS at 11:36

## 2022-04-20 RX ADMIN — ONDANSETRON 4 MG: 2 INJECTION INTRAMUSCULAR; INTRAVENOUS at 12:49

## 2022-04-20 RX ADMIN — FENTANYL CITRATE 100 MCG: 50 INJECTION, SOLUTION INTRAMUSCULAR; INTRAVENOUS at 12:52

## 2022-04-20 RX ADMIN — MIDAZOLAM HYDROCHLORIDE 2 MG: 1 INJECTION, SOLUTION INTRAMUSCULAR; INTRAVENOUS at 10:12

## 2022-04-20 RX ADMIN — KETOROLAC TROMETHAMINE 30 MG: 30 INJECTION, SOLUTION INTRAMUSCULAR at 12:49

## 2022-04-20 RX ADMIN — LIDOCAINE HYDROCHLORIDE 100 MG: 20 INJECTION, SOLUTION EPIDURAL; INFILTRATION; INTRACAUDAL at 10:17

## 2022-04-20 RX ADMIN — PROPOFOL 200 MG: 10 INJECTION, EMULSION INTRAVENOUS at 10:17

## 2022-04-20 RX ADMIN — CEFAZOLIN 2 G: 330 INJECTION, POWDER, FOR SOLUTION INTRAMUSCULAR; INTRAVENOUS at 10:24

## 2022-04-20 ASSESSMENT — PAIN DESCRIPTION - PAIN TYPE
TYPE: SURGICAL PAIN

## 2022-04-20 ASSESSMENT — FIBROSIS 4 INDEX: FIB4 SCORE: 0.59

## 2022-04-20 ASSESSMENT — PAIN SCALES - GENERAL: PAIN_LEVEL: 0

## 2022-04-20 NOTE — OP REPORT
PreOp   : infertility.   ovarian cyst endometrioma.        PostOp Diagnosis: same   pelvic endometriosis. 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        Procedure(s):  EXCISION OR FULGURATION, ENDOMETRIOSIS, LAPAROSCOPIC - Wound Class: Clean  CHROMOPERTUBATION,FALLOPIAN TUBE-FOR TUBAL INSUFFLATION - Wound Class: Clean Contaminated  EXCISION, CYST, OVARY-POSSIBLE - Wound Class: Clean Contaminated     Surgeon(s):  PATRICIO Infante M.D.     Anesthesiologist/Type of Anesthesia:  Anesthesiologist: Evert Jauregui M.D./General     Surgical Staff:  Circulator: Maggie Lorenzo R.N.; Anneliese Hatfield R.N.  Relief Scrub: Jessica Obrien  Scrub Person: Earnestine Ratliff     Specimens removed if any:  ID Type Source Tests Collected by Time Destination   A : Right and Left Ovarian Cysts Cyst Cyst PATHOLOGY SPECIMEN Oracio Pardo M.D. 4/20/2022 11:52 AM     B : Peritoneal Endometriosis Other Peritoneal Fluid PATHOLOGY SPECIMEN Oracio Pardo M.D. 4/20/2022 12:20 PM           Estimated Blood Loss:minimal.     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   serosal surface of uterus has vesicular lesions and flimsy bands gypsy crossing over uterus.   dense bands between ovary and posterior uteine wall.   both tubes are free and patent with free flow of dye from infundibular end.  Complications: none.  Title procedure note    After informed consent patient was taken to the operating room where general anesthesia was induced without difficulty was placed in semilithotomy position and then inserted stirrups with SCDs she was prepped and draped in the usual sterile fashion.  A Hayden catheter was placed.  A self-retaining speculum was introduced into the vagina and expose the cervix.  Anterior lip of the cervix was grasped with single-tooth tenaculum and and a Hulka cancel that and the acorn uterine manipulator was introduced and held in place.    Attention was paid to the abdominal  portion of the procedure.  Gloves were changed the skin in the umbilicus the lower flap of the skin incision a 10 mm was made anterior abdominal wall was elevated in the verees needle needle was advanced into the abdominal cavity in a controlled fashion.  Appropriate placement was confirmed with low insufflation pressures.  Pneumoperitoneum was obtained with 3 to 4 L of gas.  A 10 mm trocar was introduced into the abdominal cavity in a controlled fashion without difficulty position confirmed confirmed by direct visualization through the scope.  Inspection of the underlying viscera revealed with the findings as noted above patient was then changed to Trendelenburg position.  Ancillary trochars 5 mm was placed in the right and left lower quadrants under direct visualization.    But the ovaries showed signs of endometrioma the right ovary was fixed in place with the progressed each using a laparoscopic needle 10 mm all of solution of vasopressin and diluted in 2 and mL of four-point normal saline was infiltrated in the Serial intermittent entry was noted chocolate colored cyst fluid drained.  Then using the same entry point using scissors the incision was extended then with the 2 procedures holding up the cyst wall in the ovarian wall with traction and countertraction the cyst was excised completely in toto by sharp and blunt dissection using scissors to the cyst wall was sent for pathology bleeders was coagulated using bipolar cauterization.  Attention was directed towards the right ovary the cyst wall was again infiltrated with normal saline and the infused with the Pitressin inadvertent entry into the cyst wall was noted and chocolate colored cyst fluid was drained cyst wall was further incision was further extended using scissors then the walls of the ovary and the cyst wall was grasped with a chronic traction and countertraction and sharp and blunt dissection using cautery the cyst wall was excised from spread and  sent for pathology the bleeders in the cyst wall blood was coagulated using the bipolar fluid was irrigated into the cyst wall to ensure hemostasis and further hemostasis was obtained with the bipolar then attention was directed towards the endometriotic bands of lesions on the anterior abdominal wall to the bladder reflection using the gold laser at 10 mm intensity power settings the bands of the peritoneal tissues were excised and sent for pathology the rest of the flimsy bands were excised using the gold laser to laser then the bands between the ovary and the posterior lateral pelvic wall was also examined and excised using gold laser ensuring the important structures likely related well out of harm's way the cul-de-sac was visualized no active lesions bands noted both ovaries were freed from its ovarian fossa then attention was directed towards tubal insufflation methylene blue was diluted in normal saline was pushed through the acorn cannula bilateral jet flow free flow of dye was noted from the infundibular area and into the cul-de-sac    After ensuring hemostasis pelvic cavity was copiously irrigated all the fluids were suctioned out but the ovaries were wrapped up and Interceed and attention was directed towards the surface of the serosal surface of the uterus which was a infiltrated with endometriotic.  Vesicular lesions and flimsy bands of tissue which was fulgurated using the gold laser all around the undersurface of the uterus was covered with Interceed and further hemostasis was reaffirmed and all the working spaces all instruments were removed and pneumoperitoneum was released and umbilical port fascia was closed with 0 Vicryl and the skin of all the 3 ports was closed with 4-0 Monocryl    Counts sponge lap and instrument counts were correct x2 patient was extubated and transferred to recovery room in a stable condition Hayden was removed in the OR thank you

## 2022-04-20 NOTE — OR NURSING
1339 Patient arrived from OR. ID verified. Report received. 6L 02 via mask. x3 Lap sites covered with gauze and Tegaderm. peripad clean. Patient sleep easy to arouse. Vital signs stable.  1410 Oxycodone given for pain as ordered.   1416 Patient tolerating po intake.   1421 S.O at the bedside updated plan of care.  1521 Mom at the bedside updated plan of care.   1541 Patient assisted to bathroom able to void without difficulties.   1550 Criteria met to discharge patient home.   1610 Patient escorted via w/c to responsible adult with all her personal belongings.

## 2022-04-20 NOTE — ANESTHESIA PREPROCEDURE EVALUATION
Case: [id number] Date/Time: 04/20/22 1015    Procedures:       EXCISION OR FULGURATION, ENDOMETRIOSIS, LAPAROSCOPIC      CHROMOPERTUBATION,FALLOPIAN TUBE-FOR TUBAL INSUFFLATION      EXCISION, CYST, OVARY-POSSIBLE (Right )      [id number] AND ALL INDICATED PROCEDURES    Pre-op diagnosis: ENDOMETRIOSIS    Location: [facility] ROOM 25 / SURGERY SAME DAY [facility]    Surgeons: Oracio Pardo M.D.          Relevant Problems   No relevant active problems       Physical Exam    Airway   Mallampati: II  TM distance: >3 FB  Neck ROM: full       Cardiovascular - normal exam  Rhythm: regular  Rate: normal  (-) murmur     Dental - normal exam           Pulmonary - normal exam  Breath sounds clear to auscultation     Abdominal    Neurological - normal exam                 Anesthesia Plan    ASA 2       Plan - general       Airway plan will be ETT          Induction: intravenous    Postoperative Plan: Postoperative administration of opioids is intended.    Pertinent diagnostic labs and testing reviewed    Informed Consent:    Anesthetic plan and risks discussed with patient.    Use of blood products discussed with: patient whom consented to blood products.

## 2022-04-20 NOTE — OR SURGEON
Immediate Post OP Note    PreOp   : infertility.   ovarian cyst endometrioma.      PostOp Diagnosis: same   pelvic endometriosis. 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      Procedure(s):  EXCISION OR FULGURATION, ENDOMETRIOSIS, LAPAROSCOPIC - Wound Class: Clean  CHROMOPERTUBATION,FALLOPIAN TUBE-FOR TUBAL INSUFFLATION - Wound Class: Clean Contaminated  EXCISION, CYST, OVARY-POSSIBLE - Wound Class: Clean Contaminated    Surgeon(s):  PATRICIO Infante M.D.    Anesthesiologist/Type of Anesthesia:  Anesthesiologist: Evert Jauregui M.D./General    Surgical Staff:  Circulator: Maggie Lorenzo R.N.; Anneliese Hatfield R.N.  Relief Scrub: Jessica Obrien  Scrub Person: Earnestine Ratliff    Specimens removed if any:  ID Type Source Tests Collected by Time Destination   A : Right and Left Ovarian Cysts Cyst Cyst PATHOLOGY SPECIMEN Oracio Pardo M.D. 4/20/2022 11:52 AM    B : Peritoneal Endometriosis Other Peritoneal Fluid PATHOLOGY SPECIMEN Oracio Pardo M.D. 4/20/2022 12:20 PM        Estimated Blood Loss:minimal.    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   serosal surface of uterus has vesicular lesions and flimsy bands gypsy crossing over uterus.   dense bands between ovary and posterior uteine wall.   both tubes are free and patent with free flow of dye from infundibular end.  Complications: none           4/20/2022 1:11 PM Oracio Pardo M.D.

## 2022-04-20 NOTE — ANESTHESIA TIME REPORT
Anesthesia Start and Stop Event Times     Date Time Event    4/20/2022 0938 Ready for Procedure     1011 Anesthesia Start     1321 Anesthesia Stop        Responsible Staff  04/20/22    Name Role Begin End    Evert Jauregui M.D. Anesth 1011 1321        Overtime Reason:  no overtime (within assigned shift)    Comments:

## 2022-04-20 NOTE — ANESTHESIA POSTPROCEDURE EVALUATION
Patient: Nissa Oliveros    Procedure Summary     Date: 04/20/22 Room / Location: [facility] ROOM 25 / SURGERY SAME DAY [facility]    Anesthesia Start: 1011 Anesthesia Stop: 1321    Procedures:       EXCISION OR FULGURATION, ENDOMETRIOSIS, LAPAROSCOPIC (Bilateral )      CHROMOPERTUBATION,FALLOPIAN TUBE-FOR TUBAL INSUFFLATION (Bilateral )      EXCISION, CYST, OVARY-POSSIBLE (Bilateral ) Diagnosis: (ENDOMETRIOSIS)    Surgeons: Oracio Pardo M.D. Responsible Provider: Evert Jauregui M.D.    Anesthesia Type: general ASA Status: 2          Final Anesthesia Type: general  Last vitals  BP   Blood Pressure: 121/73    Temp   36.2 °C (97.2 °F)    Pulse   (!) 102   Resp   16    SpO2   100 %      Anesthesia Post Evaluation    Patient location during evaluation: PACU  Patient participation: complete - patient participated  Level of consciousness: awake and alert  Pain score: 0    Airway patency: patent  Anesthetic complications: no  Cardiovascular status: hemodynamically stable  Respiratory status: acceptable  Hydration status: euvolemic    PONV: none          There were no known complications for this encounter.     Nurse Pain Score: 0 (NPRS)

## 2022-04-20 NOTE — ANESTHESIA PROCEDURE NOTES
Airway    Date/Time: 4/20/2022 10:17 AM  Performed by: Evert Jauregui M.D.  Authorized by: Evert Jauregui M.D.     Location:  OR  Urgency:  Elective  Difficult Airway: No    Indications for Airway Management:  Anesthesia      Spontaneous Ventilation: absent    Sedation Level:  Deep  Preoxygenated: Yes    Patient Position:  Sniffing  Mask Difficulty Assessment:  1 - vent by mask  Final Airway Type:  Endotracheal airway  Final Endotracheal Airway:  ETT  Cuffed: Yes    Technique Used for Successful ETT Placement:  Direct laryngoscopy    Insertion Site:  Oral  Blade Type:  [place]  Laryngoscope Blade/Videolaryngoscope Blade Size:  2  ETT Size (mm):  7.5  Measured from:  Teeth  ETT to Teeth (cm):  22  Placement Verified by: auscultation and capnometry    Cormack-Lehane Classification:  Grade I - full view of glottis  Number of Attempts at Approach:  1

## 2022-04-20 NOTE — DISCHARGE INSTRUCTIONS
ACTIVITY: Rest and take it easy for the first 24 hours.  A responsible adult is recommended to remain with you during that time.  It is normal to feel sleepy.  We encourage you to not do anything that requires balance, judgment or coordination.    MILD FLU-LIKE SYMPTOMS ARE NORMAL. YOU MAY EXPERIENCE GENERALIZED MUSCLE ACHES, THROAT IRRITATION, HEADACHE AND/OR SOME NAUSEA.    FOR 24 HOURS DO NOT:  Drive, operate machinery or run household appliances.  Drink beer or alcoholic beverages.   Make important decisions or sign legal documents.    SPECIAL INSTRUCTIONS: Follow attached handout.    DIET: To avoid nausea, slowly advance diet as tolerated, avoiding spicy or greasy foods for the first day.  Add more substantial food to your diet according to your physician's instructions.  Babies can be fed formula or breast milk as soon as they are hungry.  INCREASE FLUIDS AND FIBER TO AVOID CONSTIPATION.    SURGICAL DRESSING/BATHING: No bathing or soaking in water until approved by physician.     FOLLOW-UP APPOINTMENT:  A follow-up appointment should be arranged with your doctor; call to schedule.    You should CALL YOUR PHYSICIAN if you develop:  Fever greater than 101 degrees F.  Pain not relieved by medication, or persistent nausea or vomiting.  Excessive bleeding (blood soaking through dressing) or unexpected drainage from the wound.  Extreme redness or swelling around the incision site, drainage of pus or foul smelling drainage.  Inability to urinate or empty your bladder within 8 hours.  Problems with breathing or chest pain.    You should call 911 if you develop problems with breathing or chest pain.  If you are unable to contact your doctor or surgical center, you should go to the nearest emergency room or urgent care center.  Physician's telephone #: Dr Pardo [phone]    If any questions arise, call your doctor.  If your doctor is not available, please feel free to call the Surgical Center at  [phone].     A registered nurse may call you a few days after your surgery to see how you are doing after your procedure.    MEDICATIONS: Resume taking daily medication.  Take prescribed pain medication with food.  If no medication is prescribed, you may take non-aspirin pain medication if needed.  PAIN MEDICATION CAN BE VERY CONSTIPATING.  Take a stool softener or laxative such as senokot, pericolace, or milk of magnesia if needed.    Prescription given for PERCOCET.  Last pain medication given at _Percocet given at 2:10 pm__.    If your physician has prescribed pain medication that includes Acetaminophen (Tylenol), do not take additional Acetaminophen (Tylenol) while taking the prescribed medication.    Depression / Suicide Risk    As you are discharged from this [facility] facility, it is important to learn how to keep safe from harming yourself.    Recognize the warning signs:  · Abrupt changes in personality, positive or negative- including increase in energy   · Giving away possessions  · Change in eating patterns- significant weight changes-  positive or negative  · Change in sleeping patterns- unable to sleep or sleeping all the time   · Unwillingness or inability to communicate  · Depression  · Unusual sadness, discouragement and loneliness  · Talk of wanting to die  · Neglect of personal appearance   · Rebelliousness- reckless behavior  · Withdrawal from people/activities they love  · Confusion- inability to concentrate     If you or a loved one observes any of these behaviors or has concerns about self-harm, here's what you can do:  · Talk about it- your feelings and reasons for harming yourself  · Remove any means that you might use to hurt yourself (examples: pills, rope, extension cords, firearm)  · Get professional help from the community (Mental Health, Substance Abuse, psychological counseling)  · Do not be alone:Call your Safe Contact- someone whom you trust who will be there for  you.  · Call your local CRISIS HOTLINE 528-9379 or 679-574-1566  · Call your local Children's Mobile Crisis Response Team [place] (076) 168-2932 or www.[employer]  · Call the toll free National Suicide Prevention Hotlines   · National Suicide Prevention Lifeline 344-197-HVLC (0766)  · National [employer] Line Network 800-SUICIDE (269-7432)

## 2023-01-20 ENCOUNTER — OFFICE VISIT (OUTPATIENT)
Dept: URGENT CARE | Facility: PHYSICIAN GROUP | Age: 38
End: 2023-01-20
Payer: COMMERCIAL

## 2023-01-20 VITALS
OXYGEN SATURATION: 97 % | SYSTOLIC BLOOD PRESSURE: 102 MMHG | HEART RATE: 81 BPM | BODY MASS INDEX: 24.14 KG/M2 | HEIGHT: 69 IN | RESPIRATION RATE: 14 BRPM | DIASTOLIC BLOOD PRESSURE: 64 MMHG | TEMPERATURE: 98.1 F | WEIGHT: 163 LBS

## 2023-01-20 DIAGNOSIS — N30.01 ACUTE CYSTITIS WITH HEMATURIA: ICD-10-CM

## 2023-01-20 LAB
APPEARANCE UR: CLEAR
BILIRUB UR STRIP-MCNC: NEGATIVE MG/DL
COLOR UR AUTO: YELLOW
GLUCOSE UR STRIP.AUTO-MCNC: NEGATIVE MG/DL
INT CON NEG: NORMAL
INT CON POS: NORMAL
KETONES UR STRIP.AUTO-MCNC: NEGATIVE MG/DL
LEUKOCYTE ESTERASE UR QL STRIP.AUTO: NEGATIVE
NITRITE UR QL STRIP.AUTO: NEGATIVE
PH UR STRIP.AUTO: 6.5 [PH] (ref 5–8)
POC URINE PREGNANCY TEST: NEGATIVE
PROT UR QL STRIP: NEGATIVE MG/DL
RBC UR QL AUTO: NORMAL
SP GR UR STRIP.AUTO: 1.02
UROBILINOGEN UR STRIP-MCNC: 0.2 MG/DL

## 2023-01-20 PROCEDURE — 81025 URINE PREGNANCY TEST: CPT | Performed by: FAMILY MEDICINE

## 2023-01-20 PROCEDURE — 81002 URINALYSIS NONAUTO W/O SCOPE: CPT | Performed by: FAMILY MEDICINE

## 2023-01-20 PROCEDURE — 99203 OFFICE O/P NEW LOW 30 MIN: CPT | Performed by: FAMILY MEDICINE

## 2023-01-20 RX ORDER — SULFAMETHOXAZOLE AND TRIMETHOPRIM 800; 160 MG/1; MG/1
1 TABLET ORAL 2 TIMES DAILY
Qty: 14 TABLET | Refills: 0 | Status: SHIPPED | OUTPATIENT
Start: 2023-01-20 | End: 2023-01-27

## 2023-01-20 ASSESSMENT — ENCOUNTER SYMPTOMS
CONSTITUTIONAL NEGATIVE: 1
CARDIOVASCULAR NEGATIVE: 1
FLANK PAIN: 1
RESPIRATORY NEGATIVE: 1

## 2023-01-20 ASSESSMENT — FIBROSIS 4 INDEX: FIB4 SCORE: 0.61

## 2023-01-20 NOTE — PROGRESS NOTES
"Subjective:   Nissa Oliveros is a 37 y.o. female who presents for UTI (Pt has lower back pain, abdominal pain, painful urination x 4 days)      UTI      Review of Systems   Constitutional: Negative.    Respiratory: Negative.     Cardiovascular: Negative.    Genitourinary:  Positive for dysuria, flank pain, frequency, hematuria and urgency.     Medications, Allergies, and current problem list reviewed today in Epic.     Objective:     /64 (BP Location: Left arm, Patient Position: Sitting, BP Cuff Size: Adult)   Pulse 81   Temp 36.7 °C (98.1 °F) (Temporal)   Resp 14   Ht 1.753 m (5' 9\")   Wt 73.9 kg (163 lb)   SpO2 97%     Physical Exam  Vitals and nursing note reviewed.   Constitutional:       Appearance: Normal appearance. She is normal weight.   Cardiovascular:      Rate and Rhythm: Normal rate and regular rhythm.      Pulses: Normal pulses.      Heart sounds: Normal heart sounds.   Pulmonary:      Effort: Pulmonary effort is normal.      Breath sounds: Normal breath sounds.   Abdominal:      General: Abdomen is flat. Bowel sounds are normal. There is no distension.      Palpations: Abdomen is soft. There is no mass.      Tenderness: There is no abdominal tenderness. There is no right CVA tenderness, left CVA tenderness, guarding or rebound.      Hernia: No hernia is present.   Neurological:      Mental Status: She is alert.       Assessment/Plan:     Diagnosis and associated orders:     1. Acute cystitis with hematuria  sulfamethoxazole-trimethoprim (BACTRIM DS) 800-160 MG tablet         Comments/MDM:              Differential diagnosis, natural history, supportive care, and indications for immediate follow-up discussed.    Advised the patient to follow-up with the primary care physician for recheck, reevaluation, and consideration of further management.    Please note that this dictation was created using voice recognition software. I have made a reasonable attempt to correct obvious errors, but I " expect that there are errors of grammar and possibly content that I did not discover before finalizing the note.    This note was electronically signed by Alex Rinaldi M.D.

## 2023-01-20 NOTE — PROGRESS NOTES
"Subjective:   Nissa Oliveros is a 37 y.o. female who presents for UTI (Pt has lower back pain, abdominal pain, painful urination x 4 days)      UTI      ROS    Medications, Allergies, and current problem list reviewed today in Epic.     Objective:     /64 (BP Location: Left arm, Patient Position: Sitting, BP Cuff Size: Adult)   Pulse 81   Temp 36.7 °C (98.1 °F) (Temporal)   Resp 14   Ht 1.753 m (5' 9\")   Wt 73.9 kg (163 lb)   SpO2 97%     Physical Exam    Assessment/Plan:     Diagnosis and associated orders:     1. Acute cystitis with hematuria  sulfamethoxazole-trimethoprim (BACTRIM DS) 800-160 MG tablet         Comments/MDM:              Differential diagnosis, natural history, supportive care, and indications for immediate follow-up discussed.    Advised the patient to follow-up with the primary care physician for recheck, reevaluation, and consideration of further management.    Please note that this dictation was created using voice recognition software. I have made a reasonable attempt to correct obvious errors, but I expect that there are errors of grammar and possibly content that I did not discover before finalizing the note.    This note was electronically signed by Alex Rinaldi M.D.  "

## 2023-10-14 ENCOUNTER — HOSPITAL ENCOUNTER (EMERGENCY)
Facility: MEDICAL CENTER | Age: 38
End: 2023-10-14
Attending: STUDENT IN AN ORGANIZED HEALTH CARE EDUCATION/TRAINING PROGRAM
Payer: COMMERCIAL

## 2023-10-14 VITALS
DIASTOLIC BLOOD PRESSURE: 83 MMHG | OXYGEN SATURATION: 99 % | HEART RATE: 66 BPM | WEIGHT: 176.81 LBS | RESPIRATION RATE: 18 BRPM | HEIGHT: 69 IN | BODY MASS INDEX: 26.19 KG/M2 | TEMPERATURE: 97 F | SYSTOLIC BLOOD PRESSURE: 107 MMHG

## 2023-10-14 DIAGNOSIS — T23.231A: ICD-10-CM

## 2023-10-14 PROCEDURE — 700102 HCHG RX REV CODE 250 W/ 637 OVERRIDE(OP): Performed by: STUDENT IN AN ORGANIZED HEALTH CARE EDUCATION/TRAINING PROGRAM

## 2023-10-14 PROCEDURE — A9270 NON-COVERED ITEM OR SERVICE: HCPCS | Performed by: STUDENT IN AN ORGANIZED HEALTH CARE EDUCATION/TRAINING PROGRAM

## 2023-10-14 PROCEDURE — 16020 DRESS/DEBRID P-THICK BURN S: CPT

## 2023-10-14 PROCEDURE — 99283 EMERGENCY DEPT VISIT LOW MDM: CPT

## 2023-10-14 RX ORDER — HYDROCODONE BITARTRATE AND ACETAMINOPHEN 5; 325 MG/1; MG/1
1 TABLET ORAL ONCE
Status: COMPLETED | OUTPATIENT
Start: 2023-10-14 | End: 2023-10-14

## 2023-10-14 RX ADMIN — HYDROCODONE BITARTRATE AND ACETAMINOPHEN 1 TABLET: 5; 325 TABLET ORAL at 21:06

## 2023-10-14 ASSESSMENT — PAIN DESCRIPTION - PAIN TYPE: TYPE: ACUTE PAIN

## 2023-10-14 NOTE — LETTER
"  FORM C-4:  EMPLOYEE’S CLAIM FOR COMPENSATION/ REPORT OF INITIAL TREATMENT  EMPLOYEE’S CLAIM - PROVIDE ALL INFORMATION REQUESTED   First Name Nissa Last Name Domo Birthdate 1985  Sex female Claim Number   Home Address [address] [facility] [place] [facility]             Zip [address]                                   Age  37 y.o. 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 Be specific and answer in detail. Use additional sheet if necessary)  slipped on some water that was on the ground and went to catch myself as I did my hand landed on the flat top grill   If you believe that you have an occupational disease, when did you first have knowledge of the disability and it relationship to your employment?  Witnesses to the Accident  NA   Nature of Injury or Occupational Disease  Burn Part(s) of Body Injured or Affected  Hand (R), N/A, N/A    I CERTIFY THAT THE ABOVE IS TRUE AND CORRECT TO THE BEST OF MY KNOWLEDGE AND THAT I HAVE PROVIDED THIS INFORMATION IN ORDER TO OBTAIN THE BENEFITS OF [place]  I HEREBY AUTHORIZE ANY " PHYSICIAN, CHIROPRACTOR, SURGEON, PRACTITIONER, OR OTHER PERSON, ANY HOSPITAL, INCLUDING [facility] OR [facility], ANY MEDICAL SERVICE ORGANIZATION, ANY INSURANCE COMPANY, OR OTHER INSTITUTION OR ORGANIZATION TO RELEASE TO EACH OTHER, ANY MEDICAL OR OTHER INFORMATION, INCLUDING BENEFITS PAID OR PAYABLE, PERTINENT TO THIS INJURY OR DISEASE, EXCEPT INFORMATION RELATIVE TO DIAGNOSIS, TREATMENT AND/OR COUNSELING FOR AIDS, PSYCHOLOGICAL CONDITIONS, ALCOHOL OR CONTROLLED SUBSTANCES, FOR WHICH I MUST GIVE SPECIFIC AUTHORIZATION.  A PHOTOSTAT OF THIS AUTHORIZATION SHALL BE AS VALID AS THE ORIGINAL.  Date    10/14/2023                  [facility]                      Employee’s Signature   THIS REPORT MUST BE COMPLETED AND MAILED WITHIN 3 WORKING DAYS OF TREATMENT   Place [facility], EMERGENCY DEPT                       Name of Facility [facility]   Date  10/14/2023 Diagnosis  (T23.232A) Partial thickness burn of multiple fingers of left hand excluding thumb, initial encounter, Active Is there evidence the injured employee was under the influence of alcohol and/or another controlled substance at the time of accident?   Hour  9:17 PM Description of Injury or Disease  Partial thickness burn of multiple fingers of left hand excluding thumb, initial encounter No   Treatment  R hand burn to finger tips of 2-4, hypothenar, and 5th digit. Noncircumfrential.  Wound care and dressing applied.  Have you advised the patient to remain off work five days or more?         No   X-Ray Findings    If Yes   From Date    To Date      From information given by the employee, together with medical evidence, can you directly connect this injury or occupational disease as job incurred? Yes If No, is employee capable of: Full Duty  No Modified Duty  Yes   Is additional medical care by a physician indicated? Yes If Modified Duty, Specify any Limitations /  "Restrictions   Patient could do 1 handed duties but otherwise needs to be off. 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   Do you know of any previous injury or disease contributing to this condition or occupational disease? No    Date 10/14/2023 Print Doctor’s Name AndrewdulceEleonora certify the employer’s copy of this form was mailed on:   Address [address] [place] [id number] [phone] INSURER’S USE ONLY   Provider’s Tax ID Number [id number] Telephone Dept: [phone]    Doctor’s Signature e-SignELEONORA SEGURA D.O. Degree M.D.      Form C-4 (rev.10/07)                                                                         BRIEF DESCRIPTION OF RIGHTS AND BENEFITS  (Pursuant to [place] 616[place])    Notice of Injury or Occupational Disease (Incident Report Form C-1): If an injury or occupational disease (OD) arises out of and in the course of employment, you must provide written notice to your employer as soon as practicable, but no later than 7 days after the accident or OD. Your employer shall maintain a sufficient supply of the required forms.    Claim for Compensation (Form C-4): If medical treatment is sought, the form C-4 is available at the place of initial treatment. A completed \"Claim for Compensation\" (Form C-4) must be filed within 90 days after an accident or OD. The treating physician or chiropractor must, within 3 working days after treatment, complete and mail to the employer, the employer's insurer and third-party , the Claim for Compensation.    Medical Treatment: If you require medical treatment for your on-the-job injury or OD, you may be required to select a physician or chiropractor from a list provided by your workers’ compensation insurer, if it has contracted with an Organization for Managed Care (MCO) or Preferred Provider Organization (PPO) or providers of health care. If your employer has not entered into a contract with an MCO or PPO, you may select a physician " or chiropractor from the Panel of Physicians and Chiropractors. Any medical costs related to your industrial injury or OD will be paid by your insurer.    Temporary Total Disability (TTD): If your doctor has certified that you are unable to work for a period of at least 5 consecutive days, or 5 cumulative days in a 20-day period, or places restrictions on you that your employer does not accommodate, you may be entitled to TTD compensation.    Temporary Partial Disability (TPD): If the wage you receive upon reemployment is less than the compensation for TTD to which you are entitled, the insurer may be required to pay you TPD compensation to make up the difference. TPD can only be paid for a maximum of 24 months.    Permanent Partial Disability (PPD): When your medical condition is stable and there is an indication of a PPD as a result of your injury or OD, within 30 days, your insurer must arrange for an evaluation by a rating physician or chiropractor to determine the degree of your PPD. The amount of your PPD award depends on the date of injury, the results of the PPD evaluation, your age and wage.    Permanent Total Disability (PTD): If you are medically certified by a treating physician or chiropractor as permanently and totally disabled and have been granted a PTD status by your insurer, you are entitled to receive monthly benefits not to exceed 66 2/3% of your average monthly wage. The amount of your PTD payments is subject to reduction if you previously received a lump-sum PPD award.    Vocational Rehabilitation Services: You may be eligible for vocational rehabilitation services if you are unable to return to the job due to a permanent physical impairment or permanent restrictions as a result of your injury or occupational disease.    Transportation and Per Carolyn Reimbursement: You may be eligible for travel expenses and per carolyn associated with medical treatment.    Reopening: You may be able to reopen your  claim if your condition worsens after claim closure.     Appeal Process: If you disagree with a written determination issued by the insurer or the insurer does not respond to your request, you may appeal to the Department of Administration, , by following the instructions contained in your determination letter. You must appeal the determination within 70 days from the date of the determination letter at [address], [address], [place], [place] [address], or [address], [place], [place] [address]. If you disagree with the  decision, you may appeal to the Department of Administration, . You must file your appeal within 30 days from the date of the  decision letter at [address], [address], [place], [place] [address], or [address], [facility] [address], [place], [place] [address]. If you disagree with a decision of an , you may file a petition for judicial review with the District Court. You must do so within 30 days of the Appeal Officer’s decision. You may be represented by an  at your own expense or you may contact the [facility] for possible representation.    [place]  for Injured Workers (NAIW): If you disagree with a  decision, you may request that NAIW represent you without charge at an  Hearing. For information regarding denial of benefits, you may contact the [facility] at: [address], [address], [place] [address], [phone], or [address], [place] [address], [phone]    To File a Complaint with the Division: If you wish to file a complaint with the  of the Division of Industrial Relations (DIR),  please contact the Workers’ Compensation Section, [address] [facility], [facility] [address], [place], [place] [address], telephone [phone], or [address] [facility] [address], [place], [place] [address], telephone  [phone].    For assistance with Workers’ Compensation Issues: You may contact the [facility] Office for Consumer Health Assistance, [facility], [facility] [address], [place] [address], Toll Free 1-428.413.5702, Web site: http://[facility].[place].gov/Programs/AMY E-mail: amy@[facility].[place].gov  D-2 (rev. 10/20)                    10/14/2023            _________________________________________________________________                                    _________________            Employee Name / Signature                                                                                                                            Date

## 2023-10-14 NOTE — Clinical Note
Nissa Oliveros was seen and treated in our emergency department on 10/14/2023.  She may return to work on 10/18/2023.       If you have any questions or concerns, please don't hesitate to call.      Eleonora Godoy D.O.

## 2023-10-15 NOTE — ED PROVIDER NOTES
ED Provider Note    CHIEF COMPLAINT  Chief Complaint   Patient presents with    Burn     Patient was at work tonight when she slipped on some water on the floor and tried to catch herself when her hand hit the flat top grill. Patient has some redness and blisters to right hand.        EXTERNAL RECORDS REVIEWED  Other None    HPI/ROS  LIMITATION TO HISTORY   Select: : None  OUTSIDE HISTORIAN(S):  None    Nissa Oliveros is a 37 y.o. female who presents with partial-thickness burns to the pads of the second through fourth digit of the right hand as well as the entire right pinky and hypothenar eminence.  Patient had fallen while at work and had used her hand to catch herself on the flattop grill.  She had sustained these injuries/burns from the fall.  She notes no other associated injury such as no head pain, no neck pain, no shoulder pain, no elbow pain no back pain from twisting.  The burn is with mild pain after ibuprofen that she took at work.  She is left-hand dominant.  She is not having any paresthesias and there was no bleeding.    PAST MEDICAL HISTORY   has a past medical history of Anemia, Gynecological disorder, Jaundice (04/04/2022), and Psychiatric problem.    SURGICAL HISTORY   has a past surgical history that includes gyn surgery (2015); lap,fulgurate/excise lesions (Bilateral, 4/20/2022); reopen fallopian tube,chromotubation (Bilateral, 4/20/2022); and ovarian cystectomy (Bilateral, 4/20/2022).    FAMILY HISTORY  Family History   Problem Relation Age of Onset    Diabetes Mother     Hypertension Mother     Cancer Father        SOCIAL HISTORY  Social History     Tobacco Use    Smoking status: Never    Smokeless tobacco: Never   Vaping Use    Vaping Use: Never used   Substance and Sexual Activity    Alcohol use: Yes     Comment: 1-2 drinks per month.     Drug use: Never    Sexual activity: Not on file       CURRENT MEDICATIONS  Home Medications       Reviewed by Jodie Garnett R.N. (Registered Nurse)  "on 10/14/23 at 2022  Med List Status: Not Addressed     Medication Last Dose Status   ibuprofen (MOTRIN) 600 MG Tab  Active   levonorgestrel-ethinyl estradiol (AVIANE) 0.1-20 MG-MCG per tablet  Active   Phenazopyridine HCl (AZO URINARY PAIN PO)  Active                    ALLERGIES  No Known Allergies    PHYSICAL EXAM  VITAL SIGNS: /83   Pulse 66   Temp 36.1 °C (97 °F) (Temporal)   Resp 18   Ht 1.753 m (5' 9\")   Wt 80.2 kg (176 lb 12.9 oz)   LMP 10/03/2023   SpO2 99%   BMI 26.11 kg/m²    Constitutional: Awake and alert. No acute distress  HEENT: Normal Conjunctiva.  Neck: Grossly normal range of motion. Airway midline.  Cardiovascular: Radial pulse 2+ b/l  Thorax & Lungs: No respiratory distress.   Skin: No obvious rash. There is blister to the pad of R 2-4 digit.  There is burn erythema to medial aspect of 5th digit including distal pad and down to the hypothenar emminence. No findings on dorsal hand.   Musculoskeletal: No obvious deformity. Moves all extremities Well. Able to make fist, cross fingers, spread fingers.  Neurologic: A&Ox3.   Psychiatric: Mood and affect are appropriate for situation.    COURSE & MEDICAL DECISION MAKING    ED Observation Status? No; Patient does not meet criteria for ED Observation.     INITIAL ASSESSMENT, COURSE AND PLAN  Care Narrative: 87-year-old female sustained a work related burn to the nondominant hand after a fall.  Afebrile reassuring vitals  Partial-thickness burn to the pads of 2 through 4, hypothenar eminence and entire pinky.  Burns are noncircumferential and there is no involvement of the dorsal aspect.  Remains with full function of the hand reports minimal pain.  We will apply topical ointment and dressed the wounds.  We discussed pain medication including Tylenol and ibuprofen for her wounds.  She will need follow-up in 3 to 5 days with Workmen's Comp. or her primary care to reevaluate the wound and perform wound care.      ADDITIONAL PROBLEM " LIST  None  DISPOSITION AND DISCUSSIONS  I have discussed management of the patient with the following physicians and MARCIO's:  None    Discussion of management with other Q or appropriate source(s): None     Escalation of care considered, and ultimately not performed:acute inpatient care management, however at this time, the patient is most appropriate for outpatient management    Barriers to care at this time, including but not limited to:  Work Comp .     Decision tools and prescription drugs considered including, but not limited to:  None .    FINAL DIAGNOSIS  1. Burn of multiple fingers without thumb, right hand, second degree, initial encounter Active          Electronically signed by: Eleonora Godoy D.O., 10/14/2023 9:12 PM

## 2023-10-15 NOTE — ED NOTES
"Discharge orders received. Patient educated on \"After Visit Summary\" by ERP and patient verbalized understanding without further questions. Patient's worker's compensation paperwork completed. All belongings sent with patient upon departure from ED. Patient ambulated with a steady gait out of ED.   "

## 2023-10-15 NOTE — ED TRIAGE NOTES
Chief Complaint   Patient presents with    Burn     Patient was at work tonight when she slipped on some water on the floor and tried to catch herself when her hand hit the flat top grill. Patient has some redness and blisters to right hand.

## 2023-10-15 NOTE — DISCHARGE INSTRUCTIONS
Please keep the wounds dry.  If you go to base please do not scrub the areas.  Do not pop the blisters as they are acting as a barrier currently.  Please follow-up with [facility] or your work comp provider in the next 3 to 5 days for wound recheck.

## (undated) DEVICE — GOWN SURGEONS X-LARGE - DISP. (30/CA)

## (undated) DEVICE — PROTECTOR ULNA NERVE - (36PR/CA)

## (undated) DEVICE — ELECTRODE 850 FOAM ADHESIVE - HYDROGEL RADIOTRNSPRNT (50/PK)

## (undated) DEVICE — TROCAR 5X100 BLADED ADVANCE - FIXATION (6/BX)

## (undated) DEVICE — HEAD HOLDER JUNIOR/ADULT

## (undated) DEVICE — SUCTION INSTRUMENT YANKAUER BULBOUS TIP W/O VENT (50EA/CA)

## (undated) DEVICE — DRAPE STRLE REG TOWEL 18X24 - (10/BX 4BX/CA)"

## (undated) DEVICE — TUBING CLEARLINK DUO-VENT - C-FLO (48EA/CA)

## (undated) DEVICE — SET EXTENSION WITH 2 PORTS (48EA/CA) ***PART #2C8610 IS A SUBSTITUTE*****

## (undated) DEVICE — TROCAR Z THREAD 11 X 100 - BLADED (6/BX)

## (undated) DEVICE — CATHETER IV 20 GA X 1-1/4 ---SURG.& SDS ONLY--- (50EA/BX)

## (undated) DEVICE — SUTURE 4-0 MONOCRYL PLUS PS-2 - 27 INCH (36/BX)

## (undated) DEVICE — NEEDLE INSFL 120MM 14GA VRRS - (20/BX)

## (undated) DEVICE — TOWEL STOP TIMEOUT SAFETY FLAG (40EA/CA)

## (undated) DEVICE — GLOVE LITE HANDLE DISP. - (1/PK 80PK/CS)

## (undated) DEVICE — DRESSING INTERCEED ABSORBABLE ADHESION BARRIER TC7 (10EA/CA)

## (undated) DEVICE — GLOVE BIOGEL SZ 7 SURGICAL PF LTX - (50PR/BX 4BX/CA)

## (undated) DEVICE — SUTURE 0 VICRYL PLUS UR-6 - 27 INCH (36/BX)

## (undated) DEVICE — DRAPESURG STERI-DRAPE LONG - (10/BX 4BX/CA)

## (undated) DEVICE — MASK ANESTHESIA ADULT  - (100/CA)

## (undated) DEVICE — LACTATED RINGERS INJ 1000 ML - (14EA/CA 60CA/PF)

## (undated) DEVICE — TUBE CONNECTING SUCTION - CLEAR PLASTIC STERILE 72 IN (50EA/CA)

## (undated) DEVICE — CHLORAPREP 26 ML APPLICATOR - ORANGE TINT(25/CA)

## (undated) DEVICE — GLOVE BIOGEL SZ 6.5 SURGICAL PF LTX (50PR/BX 4BX/CA)

## (undated) DEVICE — DECANTER FLD BLS - (50/CA)

## (undated) DEVICE — IV TUBING HI-FLO RATE W/CLAMP (50/CA)

## (undated) DEVICE — PAD SANITARY 11IN MAXI IND WRAPPED  (12EA/PK 24PK/CA)

## (undated) DEVICE — MANIFOLD NEPTUNE 1 PORT (20/PK)

## (undated) DEVICE — TRAY SRGPRP PVP IOD WT PRP - (20/CA)

## (undated) DEVICE — ARMREST CRADLE FOAM - (2PR/PK 12PR/CA)

## (undated) DEVICE — KIT  I.V. START (100EA/CA)

## (undated) DEVICE — GLOVE SZ 7 BIOGEL PI MICRO - PF LF (50PR/BX 4BX/CA)

## (undated) DEVICE — HANDPIECE 10FT INTPLS SCT PLS IRRIGATION HAND CONTROL SET (6/PK)

## (undated) DEVICE — SLEEVE VASO CALF MED - (10PR/CA)

## (undated) DEVICE — KIT ANESTHESIA W/CIRCUIT & 3/LT BAG W/FILTER (20EA/CA)

## (undated) DEVICE — PROBE LAP LPT-1118A - 10/BX

## (undated) DEVICE — Device

## (undated) DEVICE — BAG RETRIEVAL 10ML (10EA/BX)

## (undated) DEVICE — SYRINGE SAFETY 5 ML 18 GA X 1-1/2 BLUNT LL (100/BX 4BX/CA)

## (undated) DEVICE — SET LEADWIRE 5 LEAD BEDSIDE DISPOSABLE ECG (1SET OF 5/EA)

## (undated) DEVICE — CANISTER SUCTION RIGID RED 1500CC (40EA/CA)

## (undated) DEVICE — DRESSING TRANSPARENT FILM TEGADERM 2.375 X 2.75"  (100EA/BX)"

## (undated) DEVICE — GOWN WARMING STANDARD FLEX - (30/CA)

## (undated) DEVICE — PACK LAPAROSCOPY - (1/CA)

## (undated) DEVICE — SODIUM CHL IRRIGATION 0.9% 1000ML (12EA/CA)

## (undated) DEVICE — PAD OR TABLE DA VINCI 2IN X 20IN X 72IN - (12EA/CA)

## (undated) DEVICE — CANISTER SUCTION 3000ML MECHANICAL FILTER AUTO SHUTOFF MEDI-VAC NONSTERILE LF DISP  (40EA/CA)

## (undated) DEVICE — ADHESIVE DERMABOND HVD MINI (12EA/BX)

## (undated) DEVICE — NEPTUNE 4 PORT MANIFOLD - (20/PK)

## (undated) DEVICE — SENSOR SPO2 NEO LNCS ADHESIVE (20/BX) SEE USER NOTES

## (undated) DEVICE — WATER IRRIGATION STERILE 1000ML (12EA/CA)

## (undated) DEVICE — SUTURE GENERAL

## (undated) DEVICE — GLOVE BIOGEL INDICATOR SZ 6.5 SURGICAL PF LTX - (50PR/BX 4BX/CA)

## (undated) DEVICE — NEEDLE FILTER ASPIRATION 18 GA X 1 1/2 IN (100EA/BX)

## (undated) DEVICE — DRESSING NON ADHERENT 3 X 4 - STERILE (100/BX 12BX/CA)

## (undated) DEVICE — SYRINGE 30 ML LL (56/BX)